# Patient Record
Sex: MALE | Race: OTHER | Employment: OTHER | URBAN - METROPOLITAN AREA
[De-identification: names, ages, dates, MRNs, and addresses within clinical notes are randomized per-mention and may not be internally consistent; named-entity substitution may affect disease eponyms.]

---

## 2022-05-13 ENCOUNTER — CONSULTATION/EVALUATION (OUTPATIENT)
Dept: URBAN - METROPOLITAN AREA CLINIC 39 | Facility: CLINIC | Age: 72
End: 2022-05-13

## 2022-05-13 DIAGNOSIS — H25.812: ICD-10-CM

## 2022-05-13 DIAGNOSIS — H35.363: ICD-10-CM

## 2022-05-13 DIAGNOSIS — H43.813: ICD-10-CM

## 2022-05-13 DIAGNOSIS — H18.513: ICD-10-CM

## 2022-05-13 DIAGNOSIS — Z96.1: ICD-10-CM

## 2022-05-13 PROCEDURE — 92250 FUNDUS PHOTOGRAPHY W/I&R: CPT

## 2022-05-13 PROCEDURE — 92286 ANT SGM IMG I&R SPECLR MIC: CPT

## 2022-05-13 PROCEDURE — 92025-1 CORNEAL TOPOGRAPHY, INS

## 2022-05-13 PROCEDURE — 92136TC INTERFEROMETRY - TECHNICAL COMPONENT

## 2022-05-13 PROCEDURE — 92134 CPTRZ OPH DX IMG PST SGM RTA: CPT

## 2022-05-13 PROCEDURE — V2799PMN IMPRIMIS PRED-MOXI-NEPAF 5ML

## 2022-05-13 PROCEDURE — 99204 OFFICE O/P NEW MOD 45 MIN: CPT

## 2022-05-13 PROCEDURE — 92015 DETERMINE REFRACTIVE STATE: CPT

## 2022-05-13 RX ORDER — PREDNISOLONE ACETATE 10 MG/ML: 1 SUSPENSION/ DROPS OPHTHALMIC

## 2022-05-13 ASSESSMENT — VISUAL ACUITY
OD_SC: J1
OD_SC: 20/60+2
OD_CC: 20/25-2
OS_SC: >J12
OS_SC: CF 5FT
OS_CC: CF 5FT
OD_CC: J1
OS_CC: >J12

## 2022-05-13 ASSESSMENT — TONOMETRY
OS_IOP_MMHG: 16
OD_IOP_MMHG: 16

## 2022-05-13 NOTE — PATIENT DISCUSSION
Patient understands condition, prognosis and need for follow up care. *Vision blue, Malyugin ring)*.

## 2022-05-13 NOTE — PATIENT DISCUSSION
Pt qualifies up to [employer]nergy Therese OS vs CV therese vs -1.25 OS and B/B+ Therese OS vs -1.25 OS. *AT THIS TIME PT IS LEANING TOWARD -1.25 TO MATCH RIGHT EYE, HE WANTS INTERMEDIATE VISION AND UNDERSTANDS HE WILL WEAR GLASSES FOR DISTANCE AS HE ALREADY IS DOING THIS*.

## 2022-05-13 NOTE — PATIENT DISCUSSION
OS only.  Synergy sukhwinder OS vs CV sukhwinder OS vs CV Goal -1.25 OS vs B+ Goal: -1.25 OS. +block, +ocucoat, +pupil stretch, +vision blue assist.

## 2022-05-13 NOTE — PATIENT DISCUSSION
VERY dense cataract. Complex and book as 2 spots. Patient leaning towards Basic+ and does not mind wearing glasses for all focal points.

## 2022-05-25 ENCOUNTER — PRE-OP/H&P (OUTPATIENT)
Dept: URBAN - METROPOLITAN AREA SURGERY 14 | Facility: SURGERY | Age: 72
End: 2022-05-25

## 2022-05-25 ENCOUNTER — SURGERY/PROCEDURE (OUTPATIENT)
Dept: URBAN - METROPOLITAN AREA CLINIC 39 | Facility: CLINIC | Age: 72
End: 2022-05-25

## 2022-05-25 DIAGNOSIS — H18.513: ICD-10-CM

## 2022-05-25 DIAGNOSIS — H25.812: ICD-10-CM

## 2022-05-25 DIAGNOSIS — H43.813: ICD-10-CM

## 2022-05-25 DIAGNOSIS — H35.363: ICD-10-CM

## 2022-05-25 DIAGNOSIS — H25.89: ICD-10-CM

## 2022-05-25 PROCEDURE — 66984 XCAPSL CTRC RMVL W/O ECP: CPT

## 2022-05-25 PROCEDURE — 99211HP H&P OFFICE/OUTPATIENT VISIT, EST

## 2022-05-26 ENCOUNTER — POST-OP (OUTPATIENT)
Dept: URBAN - METROPOLITAN AREA CLINIC 35 | Facility: CLINIC | Age: 72
End: 2022-05-26

## 2022-05-26 DIAGNOSIS — Z96.1: ICD-10-CM

## 2022-05-26 PROCEDURE — 99024 POSTOP FOLLOW-UP VISIT: CPT

## 2022-05-26 ASSESSMENT — VISUAL ACUITY
OS_SC: 20/200
OD_PH: 20/30
OD_SC: J1
OS_PH: 20/60
OD_SC: 20/100
OU_SC: J1
OS_SC: J12
OU_SC: 20/100

## 2022-05-26 ASSESSMENT — TONOMETRY
OD_IOP_MMHG: 16
OS_IOP_MMHG: 16

## 2022-06-15 ENCOUNTER — POST-OP (OUTPATIENT)
Dept: URBAN - METROPOLITAN AREA CLINIC 35 | Facility: CLINIC | Age: 72
End: 2022-06-15

## 2022-06-15 DIAGNOSIS — Z96.1: ICD-10-CM

## 2022-06-15 PROCEDURE — 99024 POSTOP FOLLOW-UP VISIT: CPT

## 2022-06-15 ASSESSMENT — VISUAL ACUITY
OS_SC: J4
OU_SC: J1
OS_SC: 20/40-1
OU_SC: 20/40-1
OD_PH: 20/50+2
OD_SC: J2
OD_SC: 20/100

## 2022-06-15 ASSESSMENT — TONOMETRY
OD_IOP_MMHG: 16
OS_IOP_MMHG: 16

## 2024-08-16 ENCOUNTER — COMPREHENSIVE EXAM (OUTPATIENT)
Dept: URBAN - METROPOLITAN AREA CLINIC 35 | Facility: CLINIC | Age: 74
End: 2024-08-16

## 2024-08-16 DIAGNOSIS — H26.492: ICD-10-CM

## 2024-08-16 DIAGNOSIS — H35.363: ICD-10-CM

## 2024-08-16 DIAGNOSIS — H52.7: ICD-10-CM

## 2024-08-16 DIAGNOSIS — H43.813: ICD-10-CM

## 2024-08-16 PROCEDURE — 92015 DETERMINE REFRACTIVE STATE: CPT

## 2024-08-16 PROCEDURE — 92014 COMPRE OPH EXAM EST PT 1/>: CPT

## 2024-08-16 ASSESSMENT — VISUAL ACUITY
OS_CC: J1
OD_CC: J1
OS_SC: J4
OD_SC: J1-
OS_SC: 20/40-1
OD_CC: 20/25-2
OD_SC: 20/100-
OS_CC: 20/25-2

## 2024-08-16 ASSESSMENT — TONOMETRY
OS_IOP_MMHG: 12
OD_IOP_MMHG: 12